# Patient Record
Sex: MALE | Race: OTHER | HISPANIC OR LATINO | URBAN - METROPOLITAN AREA
[De-identification: names, ages, dates, MRNs, and addresses within clinical notes are randomized per-mention and may not be internally consistent; named-entity substitution may affect disease eponyms.]

---

## 2019-06-28 ENCOUNTER — EMERGENCY (EMERGENCY)
Facility: HOSPITAL | Age: 24
LOS: 1 days | Discharge: HOME | End: 2019-06-28
Admitting: EMERGENCY MEDICINE
Payer: COMMERCIAL

## 2019-06-28 VITALS
HEART RATE: 101 BPM | TEMPERATURE: 99 F | OXYGEN SATURATION: 100 % | DIASTOLIC BLOOD PRESSURE: 88 MMHG | SYSTOLIC BLOOD PRESSURE: 143 MMHG | RESPIRATION RATE: 18 BRPM

## 2019-06-28 LAB
APPEARANCE UR: CLEAR — SIGNIFICANT CHANGE UP
BACTERIA # UR AUTO: NEGATIVE — SIGNIFICANT CHANGE UP
BILIRUB UR-MCNC: NEGATIVE — SIGNIFICANT CHANGE UP
BLOOD UR QL VISUAL: NEGATIVE — SIGNIFICANT CHANGE UP
COLOR SPEC: YELLOW — SIGNIFICANT CHANGE UP
GLUCOSE UR-MCNC: NEGATIVE — SIGNIFICANT CHANGE UP
HYALINE CASTS # UR AUTO: NEGATIVE — SIGNIFICANT CHANGE UP
KETONES UR-MCNC: NEGATIVE — SIGNIFICANT CHANGE UP
LEUKOCYTE ESTERASE UR-ACNC: NEGATIVE — SIGNIFICANT CHANGE UP
NITRITE UR-MCNC: NEGATIVE — SIGNIFICANT CHANGE UP
PH UR: 6 — SIGNIFICANT CHANGE UP (ref 5–8)
PROT UR-MCNC: 20 — SIGNIFICANT CHANGE UP
RBC CASTS # UR COMP ASSIST: SIGNIFICANT CHANGE UP (ref 0–?)
SP GR SPEC: 1.02 — SIGNIFICANT CHANGE UP (ref 1–1.04)
SQUAMOUS # UR AUTO: SIGNIFICANT CHANGE UP
UROBILINOGEN FLD QL: NORMAL — SIGNIFICANT CHANGE UP
WBC UR QL: SIGNIFICANT CHANGE UP (ref 0–?)

## 2019-06-28 PROCEDURE — 99283 EMERGENCY DEPT VISIT LOW MDM: CPT

## 2019-06-28 RX ORDER — LIDOCAINE 4 G/100G
1 CREAM TOPICAL ONCE
Refills: 0 | Status: COMPLETED | OUTPATIENT
Start: 2019-06-28 | End: 2019-06-28

## 2019-06-28 RX ADMIN — LIDOCAINE 1 PATCH: 4 CREAM TOPICAL at 12:33

## 2019-06-28 NOTE — ED PROVIDER NOTE - OBJECTIVE STATEMENT
Pt is a 24 y/o M nonsmoker PMHx chronic back pain p/w back pain x 4 days.  Pt states four days ago he developed gradual onset generalized lower back pain which worsens with prolonged sitting and improves with massage and laying on floor.  Pt states he attributes pain to lifting two heavy cases of water 4 hours prior to onset of pain.  Pt reports at times pain is 10/10, but presently is 8/10.  He states in the morning pain is mild, but as he remains seated in car (works for [employer]), after several hours pain gradually worsens.  Pain is nonpleuritic, nonexertional, nonradiating.  Pt notes he has had intermittent episodes of back pain for ~ 10 years, but has not been as severe.  Pt denies any fevers, chills, numbness, weakness, falls, abdominal pain, fecal/urinary incontinence, saddle anesthesia, illicit drug use, trauma, chest pain, SOB, calf pain/swelling, h/o dvt/pe in past, hemoptysis, h/o malignancy, PEREZ, hematuria, or any other specific complaints.  History obtained in presence of wife as per pt's request. Pt is a 22 y/o M nonsmoker PMHx chronic back pain p/w back pain x 4 days.  Pt states four days ago he developed gradual onset generalized lower back pain which worsens with prolonged sitting and improves with massage and laying on floor.  Pt states he attributes pain to lifting two heavy cases of water 4 hours prior to onset of pain.  Pt reports at times pain is 10/10, but presently is 8/10.  He states in the morning pain is mild, but as he remains seated in car (works for [employer]), after several hours pain gradually worsens.  Pain is nonpleuritic, nonexertional, nonradiating.  Pt notes he has had intermittent episodes of back pain for ~ 10 years, but has not been as severe.  Pt denies any fevers, chills, numbness, weakness, falls, abdominal pain, fecal/urinary incontinence, saddle anesthesia, illicit drug use, trauma, chest pain, SOB, calf pain/swelling, h/o dvt/pe in past, hemoptysis, h/o malignancy, PEREZ, hematuria, headaches, neck pain/stiffness, nasal congestion, sore throat, cough, dysuria, cloudy urine, diarrhea, rash or any other specific complaints.  History obtained in presence of wife as per pt's request.

## 2019-06-28 NOTE — ED PROVIDER NOTE - PROGRESS NOTE DETAILS
JENNIFER PEARSON:  Pt reports improvement in pain.  UA negative for blood.  pt medically stable for discharge. pt to follow up with PMD and ortho (referral list provided)

## 2019-06-28 NOTE — ED PROVIDER NOTE - NSFOLLOWUPINSTRUCTIONS_ED_ALL_ED_FT
Advance activity as tolerated.  Continue all previously prescribed medications as directed unless otherwise instructed.  Take Motrin (also sold as Advil or Ibuprofen) 400-600 mg (two or three 200 mg over the counter pills) every 8 hours as needed for moderate pain or fevers-- take with food. Take Tylenol 650mg (Two 325 mg pills) every 4-6 hours as needed for pain or fevers.  Apply warm compress to affected area for 15 minutes, 3-4 times per day.  Follow up with your primary care physician and orthopedics (referral list provided) in 48-72 hours- bring copies of your results.  Return to the ER for worsening or persistent symptoms, including but not limited to worsening/persistent pain, numbness, weakness, fevers, incontinence of stool or urine, inability to stand or walk, abdominal pain, chest pain and/or ANY NEW OR CONCERNING SYMPTOMS. If you have issues obtaining follow up, please call: [phone] to obtain a doctor or specialist who takes your insurance in your area.  You may call [phone] to make an appointment with the internal medicine clinic.

## 2019-06-28 NOTE — ED PROVIDER NOTE - PLAN OF CARE
Advance activity as tolerated.  Continue all previously prescribed medications as directed unless otherwise instructed.  Take Motrin (also sold as Advil or Ibuprofen) 400-600 mg (two or three 200 mg over the counter pills) every 8 hours as needed for moderate pain or fevers-- take with food. Take Tylenol 650mg (Two 325 mg pills) every 4-6 hours as needed for pain or fevers.  Apply warm compress to affected area for 15 minutes, 3-4 times per day.  Follow up with your primary care physician and orthopedics (referral list provided) in 48-72 hours- bring copies of your results.  Return to the ER for worsening or persistent symptoms, including but not limited to worsening/persistent pain, numbness, weakness, fevers, incontinence of stool or urine, inability to stand or walk, abdominal pain, chest pain and/or ANY NEW OR CONCERNING SYMPTOMS. If you have issues obtaining follow up, please call: [phone] to obtain a doctor or specialist who takes your insurance in your area.  You may call [phone] to make an appointment with the internal medicine clinic.

## 2019-06-28 NOTE — ED PROVIDER NOTE - NS CPE EDP MUSC LUMBAR LOC
no midline tenderness; FROM, no swelling, no redness, no warmth, no crepitus; pt reports pain improves with palpation of paraspinal muscles

## 2019-06-28 NOTE — ED PROVIDER NOTE - NONTENDER LOCATION
left lower quadrant/umbilical/left upper quadrant/right upper quadrant/right lower quadrant/right costovertebral angle/suprapubic/left costovertebral angle/periumbilical

## 2019-06-28 NOTE — ED PROVIDER NOTE - CLINICAL SUMMARY MEDICAL DECISION MAKING FREE TEXT BOX
Pt is a 22 y/o M nonsmoker PMHx chronic back pain p/w back pain x 4 days -- likely acute on chronic back pain w/o radiculopathy; not clinically concerning for spinal epidural abscess, not clinically concerning for aortic dissection, low suspicion for renal colic; no neurological deficits -- ua, ucx, pain control

## 2019-06-28 NOTE — ED PROVIDER NOTE - CARE PLAN
Principal Discharge DX:	Back pain  Assessment and plan of treatment:	Advance activity as tolerated.  Continue all previously prescribed medications as directed unless otherwise instructed.  Take Motrin (also sold as Advil or Ibuprofen) 400-600 mg (two or three 200 mg over the counter pills) every 8 hours as needed for moderate pain or fevers-- take with food. Take Tylenol 650mg (Two 325 mg pills) every 4-6 hours as needed for pain or fevers.  Apply warm compress to affected area for 15 minutes, 3-4 times per day.  Follow up with your primary care physician and orthopedics (referral list provided) in 48-72 hours- bring copies of your results.  Return to the ER for worsening or persistent symptoms, including but not limited to worsening/persistent pain, numbness, weakness, fevers, incontinence of stool or urine, inability to stand or walk, abdominal pain, chest pain and/or ANY NEW OR CONCERNING SYMPTOMS. If you have issues obtaining follow up, please call: [phone] to obtain a doctor or specialist who takes your insurance in your area.  You may call [phone] to make an appointment with the internal medicine clinic.

## 2019-06-29 LAB
BACTERIA UR CULT: SIGNIFICANT CHANGE UP
SPECIMEN SOURCE: SIGNIFICANT CHANGE UP

## 2022-01-24 ENCOUNTER — EMERGENCY (EMERGENCY)
Facility: HOSPITAL | Age: 27
LOS: 1 days | Discharge: HOME | End: 2022-01-24
Attending: EMERGENCY MEDICINE | Admitting: PERSONAL EMERGENCY RESPONSE ATTENDANT
Payer: COMMERCIAL

## 2022-01-24 VITALS
TEMPERATURE: 98 F | RESPIRATION RATE: 18 BRPM | OXYGEN SATURATION: 100 % | DIASTOLIC BLOOD PRESSURE: 67 MMHG | HEART RATE: 87 BPM | SYSTOLIC BLOOD PRESSURE: 138 MMHG

## 2022-01-24 VITALS
DIASTOLIC BLOOD PRESSURE: 65 MMHG | RESPIRATION RATE: 16 BRPM | HEART RATE: 82 BPM | OXYGEN SATURATION: 100 % | SYSTOLIC BLOOD PRESSURE: 131 MMHG

## 2022-01-24 PROBLEM — Z78.9 OTHER SPECIFIED HEALTH STATUS: Chronic | Status: ACTIVE | Noted: 2019-06-28

## 2022-01-24 LAB
BLOOD GAS VENOUS COMPREHENSIVE RESULT: SIGNIFICANT CHANGE UP
COHGB MFR BLDV: 1.2 % — SIGNIFICANT CHANGE UP
HGB BLD CALC-MCNC: 15.5 G/DL — SIGNIFICANT CHANGE UP (ref 13–17)

## 2022-01-24 PROCEDURE — 99284 EMERGENCY DEPT VISIT MOD MDM: CPT

## 2022-01-24 RX ORDER — METOCLOPRAMIDE HCL 10 MG
10 TABLET ORAL ONCE
Refills: 0 | Status: COMPLETED | OUTPATIENT
Start: 2022-01-24 | End: 2022-01-24

## 2022-01-24 RX ORDER — SODIUM CHLORIDE 9 MG/ML
1000 INJECTION INTRAMUSCULAR; INTRAVENOUS; SUBCUTANEOUS ONCE
Refills: 0 | Status: COMPLETED | OUTPATIENT
Start: 2022-01-24 | End: 2022-01-24

## 2022-01-24 RX ORDER — MAGNESIUM SULFATE 500 MG/ML
1 VIAL (ML) INJECTION ONCE
Refills: 0 | Status: DISCONTINUED | OUTPATIENT
Start: 2022-01-24 | End: 2022-01-24

## 2022-01-24 RX ORDER — MAGNESIUM SULFATE 500 MG/ML
1 VIAL (ML) INJECTION ONCE
Refills: 0 | Status: COMPLETED | OUTPATIENT
Start: 2022-01-24 | End: 2022-01-24

## 2022-01-24 RX ORDER — ACETAMINOPHEN 500 MG
1000 TABLET ORAL ONCE
Refills: 0 | Status: COMPLETED | OUTPATIENT
Start: 2022-01-24 | End: 2022-01-24

## 2022-01-24 RX ORDER — KETOROLAC TROMETHAMINE 30 MG/ML
15 SYRINGE (ML) INJECTION ONCE
Refills: 0 | Status: DISCONTINUED | OUTPATIENT
Start: 2022-01-24 | End: 2022-01-24

## 2022-01-24 RX ADMIN — Medication 10 MILLIGRAM(S): at 14:17

## 2022-01-24 RX ADMIN — SODIUM CHLORIDE 1000 MILLILITER(S): 9 INJECTION INTRAMUSCULAR; INTRAVENOUS; SUBCUTANEOUS at 14:17

## 2022-01-24 RX ADMIN — Medication 15 MILLIGRAM(S): at 14:17

## 2022-01-24 RX ADMIN — Medication 100 GRAM(S): at 16:21

## 2022-01-24 RX ADMIN — Medication 400 MILLIGRAM(S): at 15:48

## 2022-01-24 NOTE — ED PROVIDER NOTE - NEUROLOGICAL, MLM
Alert and oriented, no focal deficits, no motor or sensory deficits. Gait stable, negative finger to nose test.

## 2022-01-24 NOTE — ED ADULT NURSE REASSESSMENT NOTE - NS ED NURSE REASSESS COMMENT FT1
MD AT BEDSIDE to reassess patient. Patient is a&ox4 in chair, stating he still has a headache, was told to get medication as ordered, will do post vitals.

## 2022-01-24 NOTE — ED PROVIDER NOTE - PATIENT PORTAL LINK FT
You can access the FollowMyHealth Patient Portal offered by [facility] by registering at the following website: [email or web address]. By joining [employer]’s FollowMyHealth portal, you will also be able to view your health information using other applications (apps) compatible with our system.

## 2022-01-24 NOTE — ED PROVIDER NOTE - NSFOLLOWUPINSTRUCTIONS_ED_ALL_ED_FT
Headache/Migraine  Tyshawn Rollins MD  Director, Headache Center  [facility]  [facility]  [facility] [place] [facility] [place]  [address], [address]  [place] [address]  [phone]    Acute Headache    WHAT YOU NEED TO KNOW:    An acute headache is pain or discomfort that starts suddenly and gets worse quickly. You may have an acute headache only when you feel stress or eat certain foods. Other acute headache pain can happen every day, and sometimes several times a day.     DISCHARGE INSTRUCTIONS:    Seek care immediately if:   •You have severe pain.      •You have numbness or weakness on one side of your face or body.      •You have a headache that occurs after a blow to the head, a fall, or other trauma.       •You have a headache, are forgetful or confused, or have trouble speaking.      •You have a headache, stiff neck, and a fever.      Contact your healthcare provider if:   •You have a constant headache and are vomiting.      •You have a headache each day that does not get better, even after treatment.      •You have changes in your headaches, or new symptoms that occur when you have a headache.      •You have questions or concerns about your condition or care.      Medicines: You may need any of the following:   •Prescription pain medicine may be given. The medicine your healthcare provider recommends will depend on the kind of headaches you have. You will need to take prescription headache medicines as directed to prevent a problem called rebound headache. These headaches happen with regular use of pain relievers for headache disorders.      •NSAIDs, such as ibuprofen, help decrease swelling, pain, and fever. This medicine is available with or without a doctor's order. NSAIDs can cause stomach bleeding or kidney problems in certain people. If you take blood thinner medicine, always ask your healthcare provider if NSAIDs are safe for you. Always read the medicine label and follow directions.      •Acetaminophen decreases pain and fever. It is available without a doctor's order. Ask how much to take and how often to take it. Follow directions. Read the labels of all other medicines you are using to see if they also contain acetaminophen, or ask your doctor or pharmacist. Acetaminophen can cause liver damage if not taken correctly. Do not use more than 3 grams (3,000 milligrams) total of acetaminophen in one day.       •Antidepressants may be given for some kinds of headaches.       •Take your medicine as directed. Contact your healthcare provider if you think your medicine is not helping or if you have side effects. Tell him or her if you are allergic to any medicine. Keep a list of the medicines, vitamins, and herbs you take. Include the amounts, and when and why you take them. Bring the list or the pill bottles to follow-up visits. Carry your medicine list with you in case of an emergency.      Manage your symptoms:   •Apply heat or ice on the headache area. Use a heat or ice pack. For an ice pack, you can also put crushed ice in a plastic bag. Cover the pack or bag with a towel before you apply it to your skin. Ice and heat both help decrease pain, and heat also helps decrease muscle spasms. Apply heat for 20 to 30 minutes every 2 hours. Apply ice for 15 to 20 minutes every hour. Apply heat or ice for as long and for as many days as directed. You may alternate heat and ice.      •Relax your muscles. Lie down in a comfortable position and close your eyes. Relax your muscles slowly. Start at your toes and work your way up your body.      •Keep a record of your headaches. Write down when your headaches start and stop. Include your symptoms and what you were doing when the headache began. Record what you ate or drank for 24 hours before the headache started. Describe the pain and where it hurts. Keep track of what you did to treat your headache and if it worked.       Prevent an acute headache:   •Avoid anything that triggers an acute headache. Examples include exposure to chemicals, going to high altitude, or not getting enough sleep. Create a regular sleep routine. Go to sleep at the same time and wake up at the same time each day. Do not use electronic devices before bedtime. These may trigger a headache or prevent you from sleeping well.      •Do not smoke. Nicotine and other chemicals in cigarettes and cigars can trigger an acute headache or make it worse. Ask your healthcare provider for information if you currently smoke and need help to quit. E-cigarettes or smokeless tobacco still contain nicotine. Talk to your healthcare provider before you use these products.       •Limit alcohol as directed. Alcohol can trigger an acute headache or make it worse. If you have cluster headaches, do not drink alcohol during an episode. For other types of headaches, ask your healthcare provider if it is safe for you to drink alcohol. Ask how much is safe for you to drink, and how often.      •Exercise as directed. Exercise can reduce tension and help with headache pain. Aim for 30 minutes of physical activity on most days of the week. Your healthcare provider can help you create an exercise plan.      •Eat a variety of healthy foods. Healthy foods include fruits, vegetables, low-fat dairy products, lean meats, fish, whole grains, and cooked beans. Your healthcare provider or dietitian can help you create meals plans if you need to avoid foods that trigger headaches.      Follow up with your healthcare provider as directed: Bring your headache record with you when you see your healthcare provider. Write down your questions so you remember to ask them during your visits.      Dolor de joanna christie    LO QUE NECESITA SABER:    El dolor de joanna christie es un dolor o molestia que comienza de repente y empeora rápidamente. Usted puede tener un dolor de joanna christie sólo cuando siente estrés o come ciertos alimentos. Otro tipo dolor de joanna christie puede producirse todos los días y a veces varias veces al día.    INSTRUCCIONES SOBRE EL BILLY HOSPITALARIA:    Busque atención médica de inmediato si:  •Usted tiene dolor intenso.      •Usted tiene entumecimiento en un lado de pat ferdinand o cuerpo.      •Usted tiene un dolor de joanna que ocurre después de un golpe en la joanna, trista caída u otro trauma.      •Tiene dolor de joanna, está olvidadizo o confundido o tiene dificultad para hablar.      •Tiene dolor de joanna, rigidez en el airam y fiebre.      Comuníquese con pat médico si:  •Usted tiene un dolor de joanna haris y está vomitando.      •Usted tiene dolor de joanna todos los días y no se jennifer aun después de tratarlo.      •Agustina saba de joanna cambian u ocurren nuevos síntomas cuando tiene dolor de joanna.      •Usted tiene preguntas o inquietudes acerca de pat condición o cuidado.      Medicamentos:Es posible que usted necesite alguno de los siguientes:   •Los analgésicos recetadospodrían administrarse. El medicamento que recomienda pat médico dependerá del tipo de dolor de joanna que tenga. Usted necesitará belinda medicamentos para el dolor de joanna según las indicaciones para evitar un problema llamado dolor de joanna de rebote. Estos saba de joanna ocurren con el uso regular de analgésicos para los trastornos de dolor de joanna.      •Los PATSY,katie el ibuprofeno, ayudan a disminuir la inflamación, el dolor y la fiebre. Esther medicamento está disponible con o sin trista receta médica. Los PATSY pueden causar sangrado estomacal o problemas renales en ciertas personas. Si usted krishna un medicamento anticoagulante, siempre pregúntele a pat médico si los PATSY son seguros para usted. Siempre melisa la etiqueta de esther medicamento y siga las instrucciones.      •Acetaminofénalivia el dolor y baja la fiebre. Está disponible sin receta médica. Pregunte la cantidad y la frecuencia con que debe tomarlos. Siga las indicaciones. Melisa las etiquetas de todos los demás medicamentos que esté usando para saber si también contienen acetaminofén, o pregunte a pat médico o farmacéutico. El acetaminofén puede causar daño en el hígado cuando no se krishna de forma correcta. No use más de 3 gramos (3,000 miligramos) en total de acetaminofeno en un día.      •Antidepresivosse pueden administrar para algunos tipos de saba de joanna.      •[place] agustina medicamentos katie se le haya indicado.Consulte con pat médico si usted gloria que pat medicamento no le está ayudando o si presenta efectos secundarios. Infórmele si es alérgico a cualquier medicamento. Mantenga trista lista actualizada de los medicamentos, las vitaminas y los productos herbales que krishna. Incluya los siguientes datos de los medicamentos: cantidad, frecuencia y motivo de administración. Traiga con usted la lista o los envases de las píldoras a agustina citas de seguimiento. Lleve la lista de los medicamentos con usted en vannessa de trista emergencia.      El manejo de agustina síntomas:  •Aplique hielo o caloren la michelle donde pat hijo siente el dolor de joanna. Utilice un paquete (compresa) de hielo o calor. Para un paquete de hielo, también puede colocar hielo molido en trista bolsa plástica. Cubra el paquete de hielo o la bolsa con trista toalla pequeña antes de aplicarla en la piel. Tanto el hielo katie el calor ayudan a reducir el dolor, y el calor también contribuye a reducir los espasmos musculares. Aplique calor carroll 20 a 30 minutos cada 2 horas. Aplique hielo carroll 15 a 20 minutos cada hora. Aplique calor o hielo carroll el tiempo y la cantidad de días que se le indique. Usted puede alternar el calor y el hielo.      •Relaje agustina músculos.Acuéstese en trista posición cómoda y cierre agustina ojos. Relaje agustina músculos lentamente. Comience por los dedos de los pies y avance hacia arriba al lori de pat cuerpo.      •Registre en un diario agustina saba de joanna.Escriba cuándo comienzan y terminan agustina migrañas. Incluya agustina síntomas y qué estaba haciendo cuando comenzó la migraña. Registre lo que comió y lo que tomó las 24 horas previas al comienzo de pat migraña. Describa el dolor y dónde le duele: Lleve un registro de lo que hizo para tratar pat migraña y si obtuvo un resultado satisfactorio.      Cómo prevenir un dolor de joanna christie:  •Evite cualquier cosa que provoque un dolor de joanna christie.Los ejemplos incluyen la exposición a sustancias químicas, las grandes altitudes o no dormir lo suficiente. Gloria trista rutina para dormir. Acuéstese y levántese todos los días a la misma hora. No utilice aparatos electrónicos antes de acostarse. Pueden provocarle un dolor de joanna o impedirle dormir jacquelin.      •No fume.La nicotina y otras sustancias químicas en los cigarrillos y puros pueden desencadenar un dolor de joanna christie o empeorarlo. Pida información a pat médico si usted actualmente fuma y necesita ayuda para dejar de fumar. Los cigarrillos electrónicos o el tabaco sin humo igualmente contienen nicotina. Consulte con pat médico antes de utilizar estos productos.      •Limite el consumo de alcohol según le indicaron.El alcohol puede provocar un dolor de joanna christie o empeorarlo. Si usted tiene saba de joanna de racimo, no deja alcohol carroll un episodio. Para otros tipos de saba de joanna, pregúntele a pat proveedor de atención médica si es seguro para usted beber alcohol. Pregunte cuál es la cantidad youngblood que puede beber y con qué frecuencia.      •Ejercítese según indicaciones.El ejercicio puede reducir la tensión y ayudarlo a aliviar el dolor de joanna. Propóngase hacer 30 minutos de actividad física malika todos los días de la semana. Pat médico puede ayudarle a crear un plan de ejercicios.      •Consuma alimentos saludables y variados.Los alimentos saludables incluyen las frutas, verduras, productos lácteos bajos en grasa, antony magras, pescado y frijoles cocidos. Pat médico o dietista puede ayudarle a crear planes de comidas si desea evitar los alimentos que provocan saba de joanna.      Acuda a agustina consultas de control con pat médico según le indicaron.Traiga pat registro de saba de joanna con usted cuando visite a pat médico. Anote agustina preguntas para que se acuerde de hacerlas carroll agustina visitas.

## 2022-01-24 NOTE — ED PROVIDER NOTE - PROGRESS NOTE DETAILS
Attending MD Worley.  Pt signed out to me in hemodynamically stable condition pending labs, HA reassessment and likely TBDC,27 yo male otherwise healthy, long-standing headaches, now 3 day headache, gradual onset, carboxy sent as screening 2/2 job risk however low suspicion. Attending MD Worley.  Pt signed out to incoming team pending admin of above medications and reassessment for headache improvement.  Pt does not have a neurologist and has been advised that he will be referred for neuro f/u for ongoing management of chronic headaches.  Signed out to incoming team with neurovasc stable exam. Attending MD Worley.  Pt's headache has improved since arrival but remains significant.  PT requesting to eat and is being fed.  Magnesium and ofirmev added to his headache regimen for additional improvement.

## 2022-01-24 NOTE — ED PROVIDER NOTE - OBJECTIVE STATEMENT
26y M presents w/ constant HA x3 d. Pt states when sx first came on, it was a gradual onset and happened at work, no trauma involved. Pt notes long hx of HA, states he was imaged as a child but HA usually goes away w/ Motrin and sleep. Currently he does not feel nauseous, but does have photophobia. He has not noticed weakness or trouble walking, and is using his cell phone normally. Pt also reports soreness in his neck when he turns his head, which contributes to the HA. He states that once he had a HA where he had vision changes then the HA came on, but states that is not the usual HA he gets nor current HA.

## 2022-01-25 ENCOUNTER — EMERGENCY (EMERGENCY)
Facility: HOSPITAL | Age: 27
LOS: 1 days | Discharge: HOME | End: 2022-01-25
Admitting: EMERGENCY MEDICINE
Payer: COMMERCIAL

## 2022-01-25 VITALS
SYSTOLIC BLOOD PRESSURE: 146 MMHG | TEMPERATURE: 98 F | OXYGEN SATURATION: 100 % | RESPIRATION RATE: 17 BRPM | HEART RATE: 90 BPM | DIASTOLIC BLOOD PRESSURE: 87 MMHG

## 2022-01-25 PROCEDURE — 99285 EMERGENCY DEPT VISIT HI MDM: CPT

## 2022-01-25 NOTE — ED ADULT TRIAGE NOTE - CHIEF COMPLAINT QUOTE
Pt c/o headache X 5 days. Seen here yesterday for same S&S. Denies fevers, chills, n/v. Last took Tylenol @ 6pm. Denies any pertinent past medical Hx.

## 2022-01-26 LAB
ALBUMIN SERPL ELPH-MCNC: 4.3 G/DL — SIGNIFICANT CHANGE UP (ref 3.3–5)
ALP SERPL-CCNC: 59 U/L — SIGNIFICANT CHANGE UP (ref 40–120)
ALT FLD-CCNC: 33 U/L — SIGNIFICANT CHANGE UP (ref 4–41)
ANION GAP SERPL CALC-SCNC: 11 MMOL/L — SIGNIFICANT CHANGE UP (ref 7–14)
AST SERPL-CCNC: 24 U/L — SIGNIFICANT CHANGE UP (ref 4–40)
BASOPHILS # BLD AUTO: 0.05 K/UL — SIGNIFICANT CHANGE UP (ref 0–0.2)
BASOPHILS NFR BLD AUTO: 0.6 % — SIGNIFICANT CHANGE UP (ref 0–2)
BILIRUB SERPL-MCNC: 0.3 MG/DL — SIGNIFICANT CHANGE UP (ref 0.2–1.2)
BUN SERPL-MCNC: 12 MG/DL — SIGNIFICANT CHANGE UP (ref 7–23)
CALCIUM SERPL-MCNC: 8.9 MG/DL — SIGNIFICANT CHANGE UP (ref 8.4–10.5)
CHLORIDE SERPL-SCNC: 103 MMOL/L — SIGNIFICANT CHANGE UP (ref 98–107)
CO2 SERPL-SCNC: 23 MMOL/L — SIGNIFICANT CHANGE UP (ref 22–31)
CREAT SERPL-MCNC: 0.77 MG/DL — SIGNIFICANT CHANGE UP (ref 0.5–1.3)
EOSINOPHIL # BLD AUTO: 0.2 K/UL — SIGNIFICANT CHANGE UP (ref 0–0.5)
EOSINOPHIL NFR BLD AUTO: 2.3 % — SIGNIFICANT CHANGE UP (ref 0–6)
GLUCOSE SERPL-MCNC: 88 MG/DL — SIGNIFICANT CHANGE UP (ref 70–99)
HCT VFR BLD CALC: 45.2 % — SIGNIFICANT CHANGE UP (ref 39–50)
HGB BLD-MCNC: 15.5 G/DL — SIGNIFICANT CHANGE UP (ref 13–17)
IANC: 4.44 K/UL — SIGNIFICANT CHANGE UP (ref 1.5–8.5)
IMM GRANULOCYTES NFR BLD AUTO: 0.2 % — SIGNIFICANT CHANGE UP (ref 0–1.5)
LYMPHOCYTES # BLD AUTO: 3.66 K/UL — HIGH (ref 1–3.3)
LYMPHOCYTES # BLD AUTO: 41.4 % — SIGNIFICANT CHANGE UP (ref 13–44)
MCHC RBC-ENTMCNC: 28.9 PG — SIGNIFICANT CHANGE UP (ref 27–34)
MCHC RBC-ENTMCNC: 34.3 GM/DL — SIGNIFICANT CHANGE UP (ref 32–36)
MCV RBC AUTO: 84.3 FL — SIGNIFICANT CHANGE UP (ref 80–100)
MONOCYTES # BLD AUTO: 0.46 K/UL — SIGNIFICANT CHANGE UP (ref 0–0.9)
MONOCYTES NFR BLD AUTO: 5.2 % — SIGNIFICANT CHANGE UP (ref 2–14)
NEUTROPHILS # BLD AUTO: 4.44 K/UL — SIGNIFICANT CHANGE UP (ref 1.8–7.4)
NEUTROPHILS NFR BLD AUTO: 50.3 % — SIGNIFICANT CHANGE UP (ref 43–77)
NRBC # BLD: 0 /100 WBCS — SIGNIFICANT CHANGE UP
NRBC # FLD: 0 K/UL — SIGNIFICANT CHANGE UP
PLATELET # BLD AUTO: 357 K/UL — SIGNIFICANT CHANGE UP (ref 150–400)
POTASSIUM SERPL-MCNC: 4.2 MMOL/L — SIGNIFICANT CHANGE UP (ref 3.5–5.3)
POTASSIUM SERPL-SCNC: 4.2 MMOL/L — SIGNIFICANT CHANGE UP (ref 3.5–5.3)
PROT SERPL-MCNC: 7.5 G/DL — SIGNIFICANT CHANGE UP (ref 6–8.3)
RBC # BLD: 5.36 M/UL — SIGNIFICANT CHANGE UP (ref 4.2–5.8)
RBC # FLD: 13 % — SIGNIFICANT CHANGE UP (ref 10.3–14.5)
SODIUM SERPL-SCNC: 137 MMOL/L — SIGNIFICANT CHANGE UP (ref 135–145)
WBC # BLD: 8.83 K/UL — SIGNIFICANT CHANGE UP (ref 3.8–10.5)
WBC # FLD AUTO: 8.83 K/UL — SIGNIFICANT CHANGE UP (ref 3.8–10.5)

## 2022-01-26 PROCEDURE — 70450 CT HEAD/BRAIN W/O DYE: CPT | Mod: 26,MA

## 2022-01-26 RX ORDER — ACETAMINOPHEN 500 MG
975 TABLET ORAL ONCE
Refills: 0 | Status: COMPLETED | OUTPATIENT
Start: 2022-01-26 | End: 2022-01-26

## 2022-01-26 RX ORDER — IBUPROFEN 200 MG
1 TABLET ORAL
Qty: 21 | Refills: 0
Start: 2022-01-26 | End: 2022-02-01

## 2022-01-26 RX ORDER — METOCLOPRAMIDE HCL 10 MG
10 TABLET ORAL ONCE
Refills: 0 | Status: COMPLETED | OUTPATIENT
Start: 2022-01-26 | End: 2022-01-26

## 2022-01-26 RX ORDER — SODIUM CHLORIDE 9 MG/ML
1000 INJECTION INTRAMUSCULAR; INTRAVENOUS; SUBCUTANEOUS ONCE
Refills: 0 | Status: COMPLETED | OUTPATIENT
Start: 2022-01-26 | End: 2022-01-26

## 2022-01-26 RX ORDER — KETOROLAC TROMETHAMINE 30 MG/ML
30 SYRINGE (ML) INJECTION ONCE
Refills: 0 | Status: DISCONTINUED | OUTPATIENT
Start: 2022-01-26 | End: 2022-01-26

## 2022-01-26 RX ADMIN — Medication 975 MILLIGRAM(S): at 00:21

## 2022-01-26 RX ADMIN — Medication 10 MILLIGRAM(S): at 00:21

## 2022-01-26 RX ADMIN — Medication 30 MILLIGRAM(S): at 00:21

## 2022-01-26 RX ADMIN — SODIUM CHLORIDE 1000 MILLILITER(S): 9 INJECTION INTRAMUSCULAR; INTRAVENOUS; SUBCUTANEOUS at 00:21

## 2022-01-26 NOTE — ED PROVIDER NOTE - OBJECTIVE STATEMENT
27 y/o M no reported pmhx p/w headache x 4 days. Seen in ED yesterday for same headache. Pt states headache returned after discharge from ED. Has been trying Motrin with minimal relief. Pt states gradual onset headache. No exacerbating factors. Reports mild photophobia. Pt states headache is band-like, starts bifrontal and radiates back. Pt denies vision change, focal deficits. No fever, chills, chest pain, sob.

## 2022-01-26 NOTE — ED ADULT NURSE NOTE - OBJECTIVE STATEMENT
Patient came in with the complaints of Headache . As per patient he was seen here with the same complaints yesterday. Patient denies dizziness/change in vision/speech or gait. Medications given as ordered. Patient tolerated well .Nursing care continues

## 2022-01-26 NOTE — ED PROVIDER NOTE - PATIENT PORTAL LINK FT
You can access the FollowMyHealth Patient Portal offered by [facility] by registering at the following website: [email or web address]. By joining [employer]’s FollowMyHealth portal, you will also be able to view your health information using other applications (apps) compatible with our system.

## 2022-01-26 NOTE — ED PROVIDER NOTE - CLINICAL SUMMARY MEDICAL DECISION MAKING FREE TEXT BOX
headache x 4 days. 2nd ED visit  plan  - labs  - ct head  - ivf  - symptomatic treatment  - reassess

## 2022-01-26 NOTE — ED PROVIDER NOTE - NS ED ATTENDING NAME FT
History of Present Illness


Consult date: 06/11/21


Requesting physician: LALITHA LOREDO


Reason for consult: other (Acute CVA s/p tPA)


History of present illness: 





PULMONARY/CCM CONSULT NOTE (Full dictation # [id number])





Please see dictated notes for full details





Past History


Past Medical History: GERD, hypertension, other (Chronic Pancreatitis)


Past Surgical History: Other (Perforated gastric ulcer in 2014)


Social history: smoking (Current daily smoker), alcohol abuse


Family history: hypertension (Father)





Medications and Allergies


                                    Allergies











Allergy/AdvReac Type Severity Reaction Status Date / Time


 


Penicillins Allergy  Anaphylaxis Verified 06/11/21 02:48











                                Home Medications











 Medication  Instructions  Recorded  Confirmed  Last Taken  Type


 


Omeprazole [PriLOSEC] 20 mg PO BID #60 cap 06/09/15 06/11/21 Unknown Rx


 


amLODIPine 5 mg PO QDAY #30 tablet 06/09/15 06/11/21 Unknown Rx


 


Aspirin 325 mg PO QDAY #30 tablet 06/12/21  Unknown Rx


 


AtorvaSTATin [Lipitor] 40 mg PO QHS #30 tablet 06/12/21  Unknown Rx











Active Meds: 


Active Medications





Acetaminophen (Acetaminophen 325 Mg Tab)  650 mg PO Q4H PRN


   PRN Reason: Pain MILD(1-3)/Fever >100.5/HA


Amlodipine Besylate (Amlodipine 5 Mg Tab)  5 mg PO QDAY [facility]


   Last Admin: 06/11/21 10:00 Dose:  5 mg


   Documented by: 


Atorvastatin Calcium (Atorvastatin 40 Mg Tab)  40 mg PO QHS RUI


Bisacodyl (Bisacodyl 10 Mg Rect Supp)  10 mg CA QDAY PRN


   PRN Reason: Constipation


Magnesium Hydroxide (Magnesium Hydroxide (Mom) Oral Liqd Udc)  30 ml PO Q4H PRN


   PRN Reason: Constipation


Metoclopramide HCl (Metoclopramide 10 Mg Tab)  10 mg PO Q6H PRN


   PRN Reason: Nausea And Vomiting


Morphine Sulfate (Morphine 2 Mg/1 Ml Inj)  2 mg IV Q4H PRN


   PRN Reason: Pain, Moderate (4-6)


Ondansetron HCl (Ondansetron 4 Mg/2 Ml Inj)  4 mg IV Q8H PRN


   PRN Reason: Nausea And Vomiting


Pantoprazole Sodium (Pantoprazole 20 Mg Tab)  20 mg PO BID [facility]


   Last Admin: 06/11/21 10:01 Dose:  20 mg


   Documented by: 


Promethazine HCl (Promethazine 25 Mg Rect Supp)  25 mg CA Q6H PRN


   PRN Reason: Nausea And Vomiting


Sodium Chloride (Sodium Chloride 0.9% 10 Ml Flush Syringe)  10 ml IV PRN PRN


   PRN Reason: LINE FLUSH


Sodium Chloride (Sodium Chloride 0.9% 10 Ml Flush Syringe)  10 ml IV BID RUI


   Last Admin: 06/11/21 10:01 Dose:  10 ml


   Documented by: 











Physical Examination


Vital signs: 


                                   Vital Signs











Temp Pulse Resp BP Pulse Ox


 


 98.1 F   87   22   136/91   96 


 


 06/11/21 00:10  06/11/21 00:10  06/11/21 00:10  06/11/21 00:10  06/11/21 00:10














Results





- Laboratory Findings


CBC and BMP: 


                                 06/12/21 04:47





                                 06/12/21 04:47


PT/INR, D-dimer











PT  13.7 Sec. (12.2-14.9)   06/11/21  00:44    


 


INR  0.99  (0.87-1.13)   06/11/21  00:44    








Abnormal lab findings: 


                                  Abnormal Labs











  06/11/21 06/11/21 06/11/21





  00:44 00:44 00:44


 


WBC  15.2 H  


 


MCHC  35 H  


 


Baso # (Auto)  0.2 H  


 


Seg Neutrophils #  10.1 H  


 


Carbon Dioxide   18 L 


 


BUN   6 L 


 


Creatinine   0.7 L 


 


AST   47 H 


 


Alkaline Phosphatase   145 H 


 


Total Creatine Kinase   228 H 


 


Total Protein   6.2 L 


 


Plasma/Serum Alcohol    0.13 H Silver

## 2022-01-26 NOTE — ED PROVIDER NOTE - NEUROLOGICAL, MLM
Alert and oriented, no focal deficits, no motor or sensory deficits. No pronator drift. No dysmetria. Gait stable.

## 2022-01-31 ENCOUNTER — APPOINTMENT (OUTPATIENT)
Dept: PAIN MANAGEMENT | Facility: CLINIC | Age: 27
End: 2022-01-31

## 2022-03-09 ENCOUNTER — NON-APPOINTMENT (OUTPATIENT)
Age: 27
End: 2022-03-09

## 2022-03-09 ENCOUNTER — APPOINTMENT (OUTPATIENT)
Dept: NEUROLOGY | Facility: CLINIC | Age: 27
End: 2022-03-09
Payer: COMMERCIAL

## 2022-03-09 VITALS
DIASTOLIC BLOOD PRESSURE: 82 MMHG | HEIGHT: 67 IN | SYSTOLIC BLOOD PRESSURE: 132 MMHG | HEART RATE: 84 BPM | BODY MASS INDEX: 43.16 KG/M2 | WEIGHT: 275 LBS

## 2022-03-09 DIAGNOSIS — Z78.9 OTHER SPECIFIED HEALTH STATUS: ICD-10-CM

## 2022-03-09 DIAGNOSIS — Z82.0 FAMILY HISTORY OF EPILEPSY AND OTHER DISEASES OF THE NERVOUS SYSTEM: ICD-10-CM

## 2022-03-09 PROBLEM — Z00.00 ENCOUNTER FOR PREVENTIVE HEALTH EXAMINATION: Status: ACTIVE | Noted: 2022-03-09

## 2022-03-09 PROCEDURE — 99215 OFFICE O/P EST HI 40 MIN: CPT

## 2022-03-09 NOTE — PHYSICAL EXAM
[General Appearance - Alert] : alert [Oriented To Time, Place, And Person] : oriented to person, place, and time [Person] : oriented to person [Place] : oriented to place [Time] : oriented to time [Short Term Intact] : short term memory intact [Fluency] : fluency intact [Current Events] : adequate knowledge of current events [Cranial Nerves Optic (II)] : visual acuity intact bilaterally,  visual fields full to confrontation, pupils equal round and reactive to light [Cranial Nerves Oculomotor (III)] : extraocular motion intact [Cranial Nerves Trigeminal (V)] : facial sensation intact symmetrically [Cranial Nerves Facial (VII)] : face symmetrical [Cranial Nerves Vestibulocochlear (VIII)] : hearing was intact bilaterally [Cranial Nerves Accessory (XI - Cranial And Spinal)] : head turning and shoulder shrug symmetric [Cranial Nerves Hypoglossal (XII)] : there was no tongue deviation with protrusion [Motor Tone] : muscle tone was normal in all four extremities [Motor Strength] : muscle strength was normal in all four extremities [Sensation Tactile Decrease] : light touch was intact [Abnormal Walk] : normal gait [Coordination - Dysmetria Impaired Finger-to-Nose Bilateral] : not present [2+] : Patella left 2+

## 2022-03-09 NOTE — HISTORY OF PRESENT ILLNESS
[FreeTextEntry1] : 26-year-old gentleman who is here for initial consultation of headache that started about 1-1/2 months ago.  Patient states that he had a similar kind of headache at the age of 9 and he has been headache free since then. \par Location holocephalic\par Quality pressure\par Severity 9 out of 10\par Frequency constant\par Duration 2 weeks\par Associated with no aura, no nausea or vomiting, no photophobia or phonophobia, no TACs, no trauma or stressors in his life.  Patient went to [facility] where he was given fluids and a CAT scan on January 26 which was unremarkable.  Patient in the interim obtained glasses and his blurred vision resolved with the glasses.  Patient was given ibuprofen from the hospital.

## 2022-03-09 NOTE — DISCUSSION/SUMMARY
[FreeTextEntry1] : 26-year-old gentleman who is here for initial consultation of new onset headache.  Patient had blurred vision which resolved with new prescription glasses.  Patient had history of headache about the age of 9 but was headache free since then.  Patient has no identifiable triggers for the headache.  Patient will obtain MRI of the brain without contrast to evaluate for any signs of structural causes of his headache.  Patient will try naproxen as needed.  Side effects were discussed with patient in detail and he will take the medication with food.  Patient will follow up with me after the studies are completed.\par \par I spent the time noted on the day of this patient encounter preparing for, providing and documenting the above E/M service and counseling and educate patient on differential, workup, disease course, and treatment/management. Education was provided to the patient during this encounter. All questions and concerns were answered and addressed in detail. The patient verbalized understanding and agreed to plan. Patient was advised to continue to monitor for neurologic symptoms and to notify my office or go to the nearest emergency room if there are any changes. Any orders/referrals and communications were provided as well. \par Side effects of the above medications were discussed in detail including but not limited to applicable black box warning and teratogenicity as appropriate. \par Patient was advised to bring previous records to my office. \par \par \par

## 2022-03-29 ENCOUNTER — RESULT REVIEW (OUTPATIENT)
Age: 27
End: 2022-03-29

## 2022-03-29 ENCOUNTER — OUTPATIENT (OUTPATIENT)
Dept: OUTPATIENT SERVICES | Facility: HOSPITAL | Age: 27
LOS: 1 days | End: 2022-03-29
Payer: COMMERCIAL

## 2022-03-29 ENCOUNTER — APPOINTMENT (OUTPATIENT)
Dept: MRI IMAGING | Facility: CLINIC | Age: 27
End: 2022-03-29

## 2022-03-29 DIAGNOSIS — R51.9 HEADACHE, UNSPECIFIED: ICD-10-CM

## 2022-03-29 PROCEDURE — 70551 MRI BRAIN STEM W/O DYE: CPT

## 2022-03-29 PROCEDURE — 70551 MRI BRAIN STEM W/O DYE: CPT | Mod: 26

## 2022-03-31 ENCOUNTER — NON-APPOINTMENT (OUTPATIENT)
Age: 27
End: 2022-03-31

## 2022-05-11 ENCOUNTER — APPOINTMENT (OUTPATIENT)
Dept: NEUROLOGY | Facility: CLINIC | Age: 27
End: 2022-05-11
Payer: COMMERCIAL

## 2022-05-11 DIAGNOSIS — R51.9 HEADACHE, UNSPECIFIED: ICD-10-CM

## 2022-05-11 PROCEDURE — 99215 OFFICE O/P EST HI 40 MIN: CPT

## 2022-05-11 RX ORDER — NAPROXEN 500 MG/1
500 TABLET ORAL
Qty: 30 | Refills: 0 | Status: ACTIVE | COMMUNITY
Start: 2022-05-11 | End: 1900-01-01

## 2022-05-11 RX ORDER — ONDANSETRON 4 MG/1
4 TABLET ORAL
Qty: 30 | Refills: 0 | Status: DISCONTINUED | COMMUNITY
Start: 2022-05-11 | End: 2022-05-11

## 2022-05-11 RX ORDER — CAMPHOR 0.45 %
25 GEL (GRAM) TOPICAL
Qty: 30 | Refills: 0 | Status: DISCONTINUED | COMMUNITY
Start: 2022-05-11 | End: 2022-05-11

## 2022-05-11 NOTE — HISTORY OF PRESENT ILLNESS
[FreeTextEntry1] : 26-year-old gentleman who is here for initial consultation of headache that started about 1-1/2 months ago.  Patient states that he had a similar kind of headache at the age of 9 and he has been headache free since then. \par Location holocephalic\par Quality pressure\par Severity 9 out of 10\par Frequency constant\par Duration 2 weeks\par Associated with no aura, no nausea or vomiting, no photophobia or phonophobia, no TACs, no trauma or stressors in his life.  Patient went to [facility] where he was given fluids and a CAT scan on January 26 which was unremarkable.  Patient in the interim obtained glasses and his blurred vision resolved with the glasses.  Patient was given ibuprofen from the hospital.\par \par Interval history: Since his last visit patient's MRI of the brain was unremarkable.  Patient never tried the naproxen that was prescribed during the last visit.  Patient states that he feels pressure in the back of his neck that would radiate to the top of his head.  Patient's blood pressure today is elevated at 140/90.  Patient has had other physicians tell him that his blood pressure is elevated as well.

## 2022-05-11 NOTE — PHYSICAL EXAM
[General Appearance - Alert] : alert [Oriented To Time, Place, And Person] : oriented to person, place, and time [Person] : oriented to person [Place] : oriented to place [Time] : oriented to time [Short Term Intact] : short term memory intact [Fluency] : fluency intact [Current Events] : adequate knowledge of current events [Cranial Nerves Optic (II)] : visual acuity intact bilaterally,  visual fields full to confrontation, pupils equal round and reactive to light [Cranial Nerves Oculomotor (III)] : extraocular motion intact [Cranial Nerves Trigeminal (V)] : facial sensation intact symmetrically [Cranial Nerves Facial (VII)] : face symmetrical [Cranial Nerves Vestibulocochlear (VIII)] : hearing was intact bilaterally [Cranial Nerves Accessory (XI - Cranial And Spinal)] : head turning and shoulder shrug symmetric [Cranial Nerves Hypoglossal (XII)] : there was no tongue deviation with protrusion [Motor Tone] : muscle tone was normal in all four extremities [Motor Strength] : muscle strength was normal in all four extremities [Sensation Tactile Decrease] : light touch was intact [Abnormal Walk] : normal gait [2+] : Patella left 2+ [Coordination - Dysmetria Impaired Finger-to-Nose Bilateral] : not present

## 2022-05-11 NOTE — DISCUSSION/SUMMARY
[FreeTextEntry1] : 26-year-old gentleman who is here for follow-up of his headache and blurry vision that resolved with new prescription glasses.  Patient reveals that patient had episode where his vision was completely lost during the headache.  Patient will be referred to neuro-ophthalmology for evaluation of papilledema as IIH is a possibility given his body habitus and visual symptoms.  Other considerations include hypertension induced headache.  Patient was advised to get a blood pressure cuff and check his blood pressure at home.\par \par Patient will follow up with me in a few weeks.\par \par I spent the time noted on the day of this patient encounter preparing for, providing and documenting the above E/M service and counseling and educate patient on differential, workup, disease course, and treatment/management. Education was provided to the patient during this encounter. All questions and concerns were answered and addressed in detail. The patient verbalized understanding and agreed to plan. Patient was advised to continue to monitor for neurologic symptoms and to notify my office or go to the nearest emergency room if there are any changes. Any orders/referrals and communications were provided as well. \par Side effects of the above medications were discussed in detail including but not limited to applicable black box warning and teratogenicity as appropriate. \par Patient was advised to bring previous records to my office. \par \par \par

## 2022-05-17 ENCOUNTER — APPOINTMENT (OUTPATIENT)
Dept: OPHTHALMOLOGY | Facility: CLINIC | Age: 27
End: 2022-05-17

## 2022-10-12 ENCOUNTER — APPOINTMENT (OUTPATIENT)
Dept: NEUROLOGY | Facility: CLINIC | Age: 27
End: 2022-10-12

## 2024-01-11 ENCOUNTER — APPOINTMENT (OUTPATIENT)
Dept: OPHTHALMOLOGY | Facility: CLINIC | Age: 29
End: 2024-01-11

## 2024-09-09 ENCOUNTER — APPOINTMENT (OUTPATIENT)
Dept: PEDIATRIC MEDICAL GENETICS | Facility: CLINIC | Age: 29
End: 2024-09-09

## 2024-09-09 PROCEDURE — XXXXX: CPT | Mod: 1L
